# Patient Record
Sex: FEMALE | Race: WHITE | Employment: OTHER | URBAN - METROPOLITAN AREA
[De-identification: names, ages, dates, MRNs, and addresses within clinical notes are randomized per-mention and may not be internally consistent; named-entity substitution may affect disease eponyms.]

---

## 2018-07-08 ENCOUNTER — TRANSFERRED RECORDS (OUTPATIENT)
Dept: HEALTH INFORMATION MANAGEMENT | Facility: CLINIC | Age: 23
End: 2018-07-08

## 2018-07-08 LAB
ALT SERPL-CCNC: 20 IU/L (ref 12–65)
AST SERPL-CCNC: 17 IU/L (ref 15–37)
CREAT SERPL-MCNC: 0.96 MG/DL (ref 0.6–1.3)
GLUCOSE SERPL-MCNC: 92 MG/DL (ref 70–100)
POTASSIUM SERPL-SCNC: 3.1 MMOL/L (ref 3.5–5.1)

## 2018-07-09 ENCOUNTER — HOSPITAL ENCOUNTER (INPATIENT)
Facility: HOSPITAL | Age: 23
LOS: 3 days | Discharge: HOME | End: 2018-07-12
Attending: PSYCHIATRY & NEUROLOGY | Admitting: PSYCHIATRY & NEUROLOGY
Payer: COMMERCIAL

## 2018-07-09 PROBLEM — F22 PARANOIA (H): Status: ACTIVE | Noted: 2018-07-09

## 2018-07-09 PROCEDURE — 12400000 ZZH R&B MH

## 2018-07-09 RX ORDER — ALUMINA, MAGNESIA, AND SIMETHICONE 2400; 2400; 240 MG/30ML; MG/30ML; MG/30ML
30 SUSPENSION ORAL EVERY 4 HOURS PRN
Status: DISCONTINUED | OUTPATIENT
Start: 2018-07-09 | End: 2018-07-12 | Stop reason: HOSPADM

## 2018-07-09 RX ORDER — OLANZAPINE 10 MG/2ML
10 INJECTION, POWDER, FOR SOLUTION INTRAMUSCULAR
Status: DISCONTINUED | OUTPATIENT
Start: 2018-07-09 | End: 2018-07-12 | Stop reason: HOSPADM

## 2018-07-09 RX ORDER — HYDROXYZINE HYDROCHLORIDE 25 MG/1
25 TABLET, FILM COATED ORAL EVERY 4 HOURS PRN
Status: DISCONTINUED | OUTPATIENT
Start: 2018-07-09 | End: 2018-07-12 | Stop reason: HOSPADM

## 2018-07-09 RX ORDER — ACETAMINOPHEN 325 MG/1
650 TABLET ORAL EVERY 4 HOURS PRN
Status: DISCONTINUED | OUTPATIENT
Start: 2018-07-09 | End: 2018-07-12 | Stop reason: HOSPADM

## 2018-07-09 RX ORDER — OLANZAPINE 10 MG/1
10 TABLET ORAL
Status: DISCONTINUED | OUTPATIENT
Start: 2018-07-09 | End: 2018-07-12 | Stop reason: HOSPADM

## 2018-07-09 RX ORDER — TRAZODONE HYDROCHLORIDE 50 MG/1
50 TABLET, FILM COATED ORAL
Status: DISCONTINUED | OUTPATIENT
Start: 2018-07-09 | End: 2018-07-12 | Stop reason: HOSPADM

## 2018-07-09 ASSESSMENT — ACTIVITIES OF DAILY LIVING (ADL)
FALL_HISTORY_WITHIN_LAST_SIX_MONTHS: NO
BATHING: 0-->INDEPENDENT
SWALLOWING: 0-->SWALLOWS FOODS/LIQUIDS WITHOUT DIFFICULTY
GROOMING: INDEPENDENT
DRESS: SCRUBS (BEHAVIORAL HEALTH)
DRESS: 0-->INDEPENDENT
LAUNDRY: UNABLE TO COMPLETE
RETIRED_COMMUNICATION: 0-->UNDERSTANDS/COMMUNICATES WITHOUT DIFFICULTY
COGNITION: 0 - NO COGNITION ISSUES REPORTED
ORAL_HYGIENE: INDEPENDENT
RETIRED_EATING: 0-->INDEPENDENT
TOILETING: 0-->INDEPENDENT
AMBULATION: 0-->INDEPENDENT
TRANSFERRING: 0-->INDEPENDENT

## 2018-07-09 NOTE — IP AVS SNAPSHOT
[facility]    [address] [place] [id number]    Phone:  [phone]    Fax:  [phone]                                       After Visit Summary   7/9/2018    Keyla Pierce    MRN: [id number]           After Visit Summary Signature Page     I have received my discharge instructions, and my questions have been answered. I have discussed any challenges I see with this plan with the nurse or doctor.    ..........................................................................................................................................  Patient/Patient Representative Signature      ..........................................................................................................................................  Patient Representative Print Name and Relationship to Patient    ..................................................               ................................................  Date                                            Time    ..........................................................................................................................................  Reviewed by Signature/Title    ...................................................              ..............................................  Date                                                            Time

## 2018-07-09 NOTE — IP AVS SNAPSHOT
MRN:[id number]                      After Visit Summary   7/9/2018    Keyla Pierce    MRN: [id number]           Thank you!     Thank you for choosing [facility] for your care. Our goal is always to provide you with excellent care. Hearing back from our patients is one way we can continue to improve our services. Please take a few minutes to complete the written survey that you may receive in the mail after you visit with us. Thank you!        Patient Information     Date Of Birth          1995        Designated Caregiver       Most Recent Value    Caregiver    Will someone help with your care after discharge? no      About your hospital stay     You were admitted on:  July 9, 2018 You last received care in the:  [facility]    You were discharged on:  July 12, 2018       Who to Call     For medical emergencies, please call 911.  For non-urgent questions about your medical care, please call your primary care provider or clinic, None          Attending Provider     Provider Specialty    Benedicto Whelan MD Psychiatry    Mary Bellamy APRN CNP Nurse Practitioner       Primary Care Provider Fax #    Physician No Ref-Primary [phone]      Further instructions from your care team       Behavioral Discharge Planning and Instructions    Summary: Keyla came into the ED due to not sleeping and having paranoid thoughts about her and her family not being safe.    Main Diagnosis: Catatonia, R/O Bipolar I Disorder with psychosis, R/O Schizo - disorders    Major Treatments, Procedures and Findings: Stabilize with medications, connect with community programs.    Symptoms to Report: feeling more aggressive, increased confusion, losing more sleep, mood getting worse or thoughts of suicide    Lifestyle Adjustment: Take all medications as prescribed, meet with doctor/ medication provider, out patient therapist as scheduled. Abstain from alcohol or any unprescribed drugs.    Psychiatry  "Follow-up:     Mobile Crisis Team/ Crisis Hotline: Call 1-252.342.9796 to reach [facility] Crisis Response    Parents will schedule her with therapy and PCP.    Resources:   Crisis Intervention: 448.219.7439 or 610-640-1226 (TTY: 821.839.8849).  Call anytime for help.  National [place] on Mental Illness (www.[place].kalina.org): 852.723.2226 or 610-292-1096.  Alcoholics Anonymous (www.alcoholics-anonymous.org): Check your phone book for your local chapter.  Suicide Awareness Voices of Education (SAVE) (www.save.org): 842-855-AIXC (2062)  National Suicide Prevention Line (www.[employer].mn.org): 865-443-KNJV (4583)  Mental Health Consumer/Survivor Network of [place] (www.mhcsn.net): 603.387.4759 or 063-817-8593  Mental Health Association of [place] (www.mentalhealth.org): 249.872.9026 or 712-948-0691    General Medication Instructions:   See your medication sheet(s) for instructions.   Take all medicines as directed.  Make no changes unless your doctor suggests them.   Go to all your doctor visits.  Be sure to have all your required lab tests. This way, your medicines can be refilled on time.  Do not use any drugs not prescribed by your doctor.  Avoid alcohol.      Pending Results     No orders found from 7/7/2018 to 7/10/2018.            Statement of Approval     Ordered          07/12/18 1206  I have reviewed and agree with all the recommendations and orders detailed in this document.  EFFECTIVE NOW     Approved and electronically signed by:  Mary Bellamy APRN CNP             Admission Information     Date & Time Provider Department Dept. Phone    7/9/2018 Mary Bellamy APRN CNP [facility] [phone]      Your Vitals Were     Blood Pressure Pulse Temperature Respirations Height Weight    113/60 91 96  F (35.6  C) (Tympanic) 18 1.549 m (5' 1\") 53.1 kg (117 lb)    Pulse Oximetry BMI (Body Mass Index)                100% 22.11 kg/m2          [facility] Information     [facility] lets you send messages to " "your doctor, view your test results, renew your prescriptions, schedule appointments and more. To sign up, go to [email or web address] . Click on \"Log in\" on the left side of the screen, which will take you to the Welcome page. Then click on \"Sign up Now\" on the right side of the page.     You will be asked to enter the access code listed below, as well as some personal information. Please follow the directions to create your username and password.     Your access code is: [email or web address]  Expires: 10/10/2018  2:18 PM     Your access code will  in 90 days. If you need help or a new code, please call your [facility] clinic or [phone].        Care EveryWhere ID     This is your Care EveryWhere ID. This could be used by other organizations to access your [facility] medical records  [id number]        Equal Access to Services     [facility]: Hadii agnieszka Snyder, washandra devine, seng powersalmabon roman, prerna watt . So [facility] [phone].    ATENCIÓN: Si habla español, tiene a worrell disposición servicios gratuitos de asistencia lingüística. Beck[place]vng al [phone].    We comply with applicable federal civil rights laws and [place] laws. We do not discriminate on the basis of race, color, national origin, age, disability, sex, sexual orientation, or gender identity.               Review of your medicines      Notice     You have not been prescribed any medications.             Protect others around you: Learn how to safely use, store and throw away your medicines at www.disposemymeds.org.             Medication List: This is a list of all your medications and when to take them. Check marks below indicate your daily home schedule. Keep this list as a reference.      Notice     You have not been prescribed any medications.      "

## 2018-07-10 LAB
ALBUMIN SERPL-MCNC: 4.3 G/DL (ref 3.4–5)
ALP SERPL-CCNC: 56 U/L (ref 40–150)
ALT SERPL W P-5'-P-CCNC: 21 U/L (ref 0–50)
ANION GAP SERPL CALCULATED.3IONS-SCNC: 8 MMOL/L (ref 3–14)
AST SERPL W P-5'-P-CCNC: 14 U/L (ref 0–45)
BILIRUB SERPL-MCNC: 0.7 MG/DL (ref 0.2–1.3)
BUN SERPL-MCNC: 7 MG/DL (ref 7–30)
CALCIUM SERPL-MCNC: 9.6 MG/DL (ref 8.5–10.1)
CHLORIDE SERPL-SCNC: 107 MMOL/L (ref 94–109)
CO2 SERPL-SCNC: 26 MMOL/L (ref 20–32)
CREAT SERPL-MCNC: 0.94 MG/DL (ref 0.52–1.04)
GFR SERPL CREATININE-BSD FRML MDRD: 73 ML/MIN/1.7M2
GLUCOSE SERPL-MCNC: 80 MG/DL (ref 70–99)
POTASSIUM SERPL-SCNC: 4.4 MMOL/L (ref 3.4–5.3)
PROT SERPL-MCNC: 7.8 G/DL (ref 6.8–8.8)
SODIUM SERPL-SCNC: 141 MMOL/L (ref 133–144)

## 2018-07-10 PROCEDURE — 25000132 ZZH RX MED GY IP 250 OP 250 PS 637: Performed by: NURSE PRACTITIONER

## 2018-07-10 PROCEDURE — 80053 COMPREHEN METABOLIC PANEL: CPT | Performed by: NURSE PRACTITIONER

## 2018-07-10 PROCEDURE — 12400000 ZZH R&B MH

## 2018-07-10 PROCEDURE — 36415 COLL VENOUS BLD VENIPUNCTURE: CPT | Performed by: NURSE PRACTITIONER

## 2018-07-10 PROCEDURE — 99223 1ST HOSP IP/OBS HIGH 75: CPT | Performed by: NURSE PRACTITIONER

## 2018-07-10 RX ORDER — LORAZEPAM 1 MG/1
1 TABLET ORAL 3 TIMES DAILY
Status: DISCONTINUED | OUTPATIENT
Start: 2018-07-10 | End: 2018-07-11

## 2018-07-10 RX ORDER — LORAZEPAM 2 MG/ML
1 INJECTION INTRAMUSCULAR 3 TIMES DAILY
Status: DISCONTINUED | OUTPATIENT
Start: 2018-07-10 | End: 2018-07-11

## 2018-07-10 RX ADMIN — TRAZODONE HYDROCHLORIDE 50 MG: 50 TABLET ORAL at 03:23

## 2018-07-10 RX ADMIN — LORAZEPAM 1 MG: 1 TABLET ORAL at 14:33

## 2018-07-10 RX ADMIN — OLANZAPINE 10 MG: 10 TABLET ORAL at 09:37

## 2018-07-10 ASSESSMENT — ACTIVITIES OF DAILY LIVING (ADL)
DRESS: SCRUBS (BEHAVIORAL HEALTH)
LAUNDRY: UNABLE TO COMPLETE
GROOMING: INDEPENDENT
ORAL_HYGIENE: INDEPENDENT

## 2018-07-10 NOTE — PLAN OF CARE
"Face to face end of shift report received from NOIN Chin. Rounding completed. Patient observed. Awake in her room when staff alerted this writer - pts roommate was on her her knees at bedside - holding pts hand - relayed to me that \"shes scared\" advised roommate that she could come and get me or other nurse to comfort pt - explained to pt the 15 min checks and we would allow no one to come into her room or hurt her - she voiced concern for her parents and their safety - advised pt I would let her know if unit rec any calls from her parents.  Pt is slow to respond - and does sit and stare at this writer - appears to be responding - but when I ask she continues to stare. Did ask her If she wanted food, fluid or a medication - did agree to a med - going to administer med.      Edith Reeves  7/10/2018  2:37 AM          "

## 2018-07-10 NOTE — PLAN OF CARE
"Problem: Patient Care Overview  Goal: Individualization & Mutuality  Patient will be treatment team and medication compliant during hospitalization.  Patient will sleep 6-8 hours per night.  Patient will attend at least 50% of groups daily.  Patient will verbalize a decrease in paranoid thoughts by discharge.  Patient will have reality based conversation during hospitalization.   Patient sitting in day room with peers at breakfast. Delayed response during introduction with this writer and eyes looking dramatically upwards. After breakfast, patient compliant with room changes d/t concerns with starting roommates boundaries. Patient has poor concentration during assessment, appears stiff and eyes are increasingly looking upwards. Denies hallucinations slowly stating, \"I'm sorry, I'm trying to stop my eyes but it's not working\". Patient would close and rub eyes which would help for a short period but eyes gradually redirect back to ceiling.   0937- Received PRN Zyprexa 10 mg. Patient states, \"thank you, I think I need something\".   Within the hour, patient is participating in groups. Does not appear as stiff and eye contact appropriate. Denies SI/HI and pain at this time.   Patient laying in bed to rest around 1100. Woke up shortly around lunch, declining tray. Continues to lay in bed napping for remainder of shift, waking up off/on when this writer went to check on. Compliant with scheduled Ativan starting this shift. Continue to monitor at this time.  Missed call from patient's father this shift. No answer when returning call. Will report to next shift.       Problem: Thought Process Alteration (Adult)  Goal: Identify Related Risk Factors and Signs and Symptoms  Related risk factors and signs and symptoms are identified upon initiation of Human [facility] Clinical Practice Guideline (CPG).   Continue to monitor at this time.   Goal: Improved Thought Process  Patient will verbalize a decrease in paranoid thinking by " discharge.  Patient will be able to have reality based conversation during hospitalization.   Continue to monitor at this time.

## 2018-07-10 NOTE — PLAN OF CARE
Problem: Patient Care Overview  Goal: Team Discussion  Team Plan:   BEHAVIORAL TEAM DISCUSSION    Participants: Anya Glover NP, Mary Bellamy NP, Fawn Palacios NP, Beverly Winter [facility], Alma Delia Contreras [facility], Megha Majano RN, Tita Ernandez RN, Mini [facility], Alma Pimentel OT, Mary Gandhi OT  Progress: new admit, paranoid  Continued Stay Criteria/Rationale: new admit, NP to assess, paranoid  Medical/Physical: None known at this time  Precautions:   Behavioral Orders   Procedures     Code 1 - Restrict to Unit     Routine Programming     As clinically indicated     Status 15     Every 15 minutes.     Plan: NP to assess and determine  Rationale for change in precautions or plan: none
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
## 2018-07-10 NOTE — PROGRESS NOTES
"Attempted several times throughout the day, unable to interview patient for H&P assessment today as she was medicated with 10mg Zyprexa earlier and remains very sleepy and difficult to engage.  She is able to agree to try Ativan for possible catatonia, stating \"yes, that would be good\".     Trial Ativan 1mg tid, monitor, and will re-assess in the morning.    Review admit notes and subsequent documentation.  "

## 2018-07-10 NOTE — PLAN OF CARE
"Problem: Patient Care Overview  Goal: Individualization & Mutuality  Patient will be treatment team and medication compliant during hospitalization.  Patient will sleep 6-8 hours per night.  Patient will attend at least 50% of groups daily.  Patient will verbalize a decrease in paranoid thoughts by discharge.  Patient will have reality based conversation during hospitalization.   Outcome: No Change  ADMISSION NOTE    Reason for admission paranoia, delusional thoughts.  Safety concerns none.  Risk for or history of violence none.   Full skin assessment: unremarkable    Patient arrived on unit from [facility] accompanied by security and ambulance staff on 7/9/2018  6:33 PM.   Status on arrival: calm and cooperative  /92  Pulse 91  Temp 97.3  F (36.3  C) (Tympanic)  Resp 15  Ht 1.549 m (5' 1\")  Wt 53.1 kg (117 lb)  SpO2 97%  BMI 22.11 kg/m2  Patient given tour of unit and Welcome to  unit papers given to patient, wanding completed, belongings inventoried, and admission assessment completed.   Patient's legal status on arrival is 72 Hour Hold. Appropriate legal rights discussed with and copy given to patient. Patient Bill of Rights discussed with and copy given to patient.   Patient denies SI, HI, and thoughts of self harm and contracts for safety while on unit.      Elsy MATTHIAS Fermin  7/9/2018  9:36 PM    Patient arrived on unit from [facility] ED. Patient was brought in due to paranoia and delusional thoughts of being mutilated and thinking her family is dead. Patient checks family's pulses in the night to see if they are alive. Reports that patient had not been sleeping and took an Excedrin PM and 3 ounces of liquid melatonin, then woke her family due to heart palpitations and feeling like she was dying. Patient was at [facility] and returned in May, since then patient has had increased paranoia and shuts down and does not want to talk about it. UDS was negative.     Patient " "pleasant and cooperative. Affect is full range, mood is calm. Admits to \"a little\" anxiety. States she is here because she was \"experiencing a lot of fear\", that she took Excedrin PM and a natural sleep aid, and that she wants \"therapy for my fears\". States she thinks people are listening to her conversations through her phone. Patient reports no current medications, or pharmacy. Allergies include dairy and gluten.    Dad called to talk to writer, states patient has had an increase in paranoia and fear in the last few months, that patient wants to get to the bottom of her fears, that college triggered this paranoia and fear that everybody is looking at her and wanting to kill her, that her whole life is out in the open.      Patient was given a late dinner, then went to her bed, states she is tired.            "

## 2018-07-10 NOTE — PLAN OF CARE
"Problem: Patient Care Overview  Goal: Individualization & Mutuality  Patient will be treatment team and medication compliant during hospitalization.  Patient will sleep 6-8 hours per night.  Patient will attend at least 50% of groups daily.  Patient will verbalize a decrease in paranoid thoughts by discharge.  Patient will have reality based conversation during hospitalization.   Outcome: No Change  Slept for 7 hours at best -  trazadone did help a bit with sleep - pt did talk more when this writer took pulse manually - was up in her room making her bed - did say she has been attending school at [employer] - taking psych classes.  Does say \"i suppose I can't do that job now\" advised her yes she could and that she would probably be better at her job - knowing the 'other side' of psych.  Polite - pleasant - appropriate this am.      "

## 2018-07-10 NOTE — PROGRESS NOTES
07/09/18 1928   Patient Belongings   Did you bring any home meds/supplements to the hospital?  No   Patient Belongings clothing;shoes   Disposition of Belongings Other (see comment)  (sent to [facility])   Belongings Search Yes   Clothing Search Yes   Second Staff kae   General Info Comment pink shirt pink flanel pj bottoms grey bra brown fleece zipup purple underwear black buckle shoes white bra blue lace bra pair black socks 3 pair black socks with color band 6 pair linda hipster under wear sudoku book small newtestament book elements of elyssa book seven things book hemp soap degree deoderant small toothpaste green tothbrush card small soft cover bible lip balm    List items sent to safe: N/A  All other belongings put in assigned cubby in belongings room.     I have reviewed my belongings list on admission and verify that it is correct.     Patient signature_______________________________    Second staff witness (if patient unable to sign) ______________________________       I have received all my belongings at discharge.    Patient signature________________________________    Damion   7/9/2018  7:53 PM

## 2018-07-10 NOTE — PLAN OF CARE
Face to face end of shift report received from Pao GODFREY RN. Rounding completed. Patient observed in room.     Elsy Fermin  7/10/2018  4:07 PM

## 2018-07-10 NOTE — PLAN OF CARE
Face to face end of shift report received from Edith MOURA RN. Rounding completed. Patient observed sitting in day room with peers. No requests at this time.     Pao Mart  7/10/2018  7:55 AM

## 2018-07-11 PROCEDURE — 12400000 ZZH R&B MH

## 2018-07-11 PROCEDURE — 25000132 ZZH RX MED GY IP 250 OP 250 PS 637: Performed by: NURSE PRACTITIONER

## 2018-07-11 PROCEDURE — 99238 HOSP IP/OBS DSCHRG MGMT 30/<: CPT | Performed by: NURSE PRACTITIONER

## 2018-07-11 RX ORDER — LORAZEPAM 0.5 MG/1
0.5 TABLET ORAL EVERY 4 HOURS PRN
Status: DISCONTINUED | OUTPATIENT
Start: 2018-07-11 | End: 2018-07-12 | Stop reason: HOSPADM

## 2018-07-11 RX ADMIN — LORAZEPAM 1 MG: 1 TABLET ORAL at 06:48

## 2018-07-11 ASSESSMENT — ACTIVITIES OF DAILY LIVING (ADL)
LAUNDRY: UNABLE TO COMPLETE
ORAL_HYGIENE: INDEPENDENT
DRESS: SCRUBS (BEHAVIORAL HEALTH);INDEPENDENT
LAUNDRY: UNABLE TO COMPLETE
DRESS: SCRUBS (BEHAVIORAL HEALTH)
ORAL_HYGIENE: INDEPENDENT
GROOMING: INDEPENDENT
GROOMING: INDEPENDENT

## 2018-07-11 NOTE — PLAN OF CARE
"Problem: Thought Process Alteration (Adult)  Goal: Improved Thought Process  Patient will verbalize a decrease in paranoid thinking by discharge.  Patient will be able to have reality based conversation during hospitalization.   Outcome: Improving  Took scheduled ativan reluctantly - was speaking to this writer and roommate clearly and appropriately - when explained what med was for pt states \"im talking\" and did just that - talked a bit - laughed appropriately - was concerned med would make her tired.  Advised her I would notify staff/CNP of her improvements and her reasoning.      "

## 2018-07-11 NOTE — PLAN OF CARE
"Problem: Patient Care Overview  Goal: Individualization & Mutuality  Patient will be treatment team and medication compliant during hospitalization.  Patient will sleep 6-8 hours per night.  Patient will attend at least 50% of groups daily.  Patient will verbalize a decrease in paranoid thoughts by discharge.  Patient will have reality based conversation during hospitalization.   Patient is calm and cooperative with this writer during nursing assessment. She presents with full range affect, mood is calm . Pt states, \"I feel much better than yesterday. My eyes rolled to the back of my head. That was scary.\" Pt states this has never happened before. She states her Potassium may have been off contributing to eye movement along with possible medications. Pt states she has not have any symptoms today. No side effects observed by this writer. Pt denies illness. Pt is scheduled no medications currently. She denies having suicidal ideation, homicidal ideation, anxiety, or depression. Pt denies having pain. Pt is attending groups and participates all day. Pt does not have any questions regarding plan of care. Pt does not make any paranoid statements. Pt has reality based conversation.  Will continue to monitor and document any changes.     Problem: Thought Process Alteration (Adult)  Goal: Improved Thought Process  Patient will verbalize a decrease in paranoid thinking by discharge.  Patient will be able to have reality based conversation during hospitalization.   Outcome: No Change  Pt had made no paranoid statements. Pt able to have reality  Based conversation.       "

## 2018-07-11 NOTE — PLAN OF CARE
Face to face end of shift report received from Ana AG RN. Rounding completed. Patient observed in group room.     Elsy Fermin  7/11/2018  3:48 PM

## 2018-07-11 NOTE — PLAN OF CARE
Face to face end of shift report received from Edith Reeves RN. Rounding completed. Patient observed.     Celsa Chapa  7/11/2018  8:01 AM

## 2018-07-11 NOTE — PLAN OF CARE
Problem: Patient Care Overview  Goal: Individualization & Mutuality  Patient will be treatment team and medication compliant during hospitalization.  Patient will sleep 6-8 hours per night.  Patient will attend at least 50% of groups daily.  Patient will verbalize a decrease in paranoid thoughts by discharge.  Patient will have reality based conversation during hospitalization.   Outcome: Improving  Slept all noc without issue

## 2018-07-11 NOTE — PLAN OF CARE
Face to face end of shift report received from NONI Chin. Rounding completed. Patient observed. Lying in supine position - eyes closed - non-labored breathing noted.     Edith Reeves  7/11/2018  1:45 AM

## 2018-07-11 NOTE — PLAN OF CARE
Problem: Patient Care Overview  Goal: Team Discussion  Team Plan:   BEHAVIORAL TEAM DISCUSSION    Participants:  Mary Bellamy NP,  Valentina Barnes NP,Zachary Henderson NP, Beverly Winter LICSW, Anjana Santos LSW,  Alma Delia Contreras SW, Naty Cordero RN, Pricila Hsieh RN,  [facility], Silvia Juarez OT, Mary Gandhi OT  Progress: Minimal: paranoid. Thought blocking.   Continued Stay Criteria/Rationale: Trial Ativan 1mg tid, monitor, and will re-assess.   Medical/Physical: eye locking in upward position.   Precautions:   Behavioral Orders   Procedures     Code 1 - Restrict to Unit     Routine Programming     As clinically indicated     Status 15     Every 15 minutes.     Plan: Stabilize on medications.   Rationale for change in precautions or plan: none
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
## 2018-07-11 NOTE — H&P
"Psychiatric Eval/H&P  Patient Name: Keyla Pierce   YOB: 1995  Age: 23 year old  [id number]    Primary Physician: No Ref-Primary, Physician   Completed By: JAIME Barreto CNP     CC:  Paranoia    HPI  (per admit) Pt brought in by EMS because she wasn't feeling well. Pt has had trouble sleeping so she took an Excedrin PM and a natural sleep aid which caused her to feel funny. EMS noted pt was acting oddly. In ED pt is paranoid, thinks someone is after her and her family, believes they are all going to die. Pt also reported she took the sleep meds to help numb the pain because she felt she was going to be attacked that night. Pt has no previous dx, OP providers, or hx of inpatient admissions. Parents report pt was herself until she came back from college. They report she is paranoid, unable to focus, only sleeping 2-3 hours a night, will wake up her family to check if they are alive, etc. Pt denies all SI/HI. No medical or legal hx. No hx of aggression. Pt denies substance abuse and UTOX negative. EKG WNL, Poison control contacted. Potassium slightly low at 3.1  Pt is medically cleared.     Patient presents much improved from yesterdays admit.  Yesterday, she struggled to carry on a conversation \"locking up\" as appearing very stiff and her eyes would lock in the up position and patient could not control this.  She was administered a dose of Zyprexa and prescribed Ativan 1mg tid as suspected catatonoia.  Currently, patient reports feeling much better, no stiffness and her eyes are not locking.  She is able to carry on a good conversation, denies feelings of paranoia currently.  Patient was also able to sleep most of the day yesterday and last night, as prior to admission she was not sleeping.  She no longer feels her parents are in danger, she does not feel like she is in any danger here on the unit.  We spend time discussing her symptoms prior to coming in as possible onset of " "schizophrenia type illness or bipolar type, patient is adamant it is not anything like that and will likely not happen again. She is aware of the symptoms associated and agrees to monitor going forward.  Patient denies suicidal or homicidal ideation and has no history of self injurious behaviors.  She denies symptoms for depression or anxiety.  Patient does complain currently of some unsteadiness while walking, feeling a bit \"woozy\", she would like to not take any Ativan and see if this goes away.  We discuss the likelihood of taking the medication is the reason she has improved, we could decrease the dose and continue.  Patient would like to discontinue though and monitor for worsening symptoms.    Past Psychiatric History:   Patient has no psychiatric history.     Social History:   Patient single, no children, and resides with her parents and brother in [place].  She has attended college at [employer] and plans to take a break from college for now and focus more on self-care.  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  She has a brother and sister, her sister is a musician out in [place] and brother also lives at home.  Patient is very close to her family.  She spends time reading the bible, she states she would enjoy going fishing or watching a movie with her family.     Chemical Use History:   Patient denies use of alcohol or illicit drugs, no history of use.     Family Psychiatric History:   None known     Medical History and ROS  Prior to Admission medications    Not on File     Allergies   Allergen Reactions     Gluten Meal      Milk Protein Extract      No past medical history on file.  No past surgical history on file.    Physical Exam  Constitutional: oriented to person, place, and time, appears well-developed and well-nourished.   HENT: WNL, no concerns  Neck: Normal range of motion   Cardiovascular: Normal rate, regular rhythm  Pulmonary/Chest: " "Effort normal and breath sounds normal  Abdominal: WNL, no concerns  Skin: Dry, intact, no open areas, rashes, moles of concern    Review of Systems:  Constitution: No weight loss, fever, night sweats  Skin: No rashes, pruritus or open wounds  Neuro: No headaches or seizure activity.  Psych:  See HPI  Eyes: No vision changes.  ENT: No problems chewing or swallowing.   Musculoskeletal: No muscle pain, joint pain or swelling   Respiratory: No cough or dyspnea  Cardiovascular:  No chest pain,  palpitations or fainting  Gastrointestinal:  No abdominal pain, nausea, vomiting or change in bowel habits    MSE/PSYCH  PSYCHIATRIC EXAM  /72  Pulse 71  Temp 98.5  F (36.9  C) (Tympanic)  Resp 14  Ht 1.549 m (5' 1\")  Wt 53.1 kg (117 lb)  SpO2 99%  BMI 22.11 kg/m2     -Appearance/Behavior: No apparent distress and Casually groomed  {attitude:pleasant and cooperative  -Motor: normal or unremarkable.  -Gait: Normal - patient feels unsteady on her feet  -Abnormal involuntary movements: None noted  -Mood: description consistent with euthymia.  -Affect: Appropriate/mood-congruent.  -Speech: Normal                  -Thought process/associations: Logical, Linear and Loose associations.  -Thought content: possible mood-congruent delusions loose associations.  -Perceptual disturbances: No hallucinations. and although perceptional paranoia exists.              -Suicidal/Homicidal Ideation: Denies  -Judgment: Good.  -Insight: Good.  *Orientation: time, place and person.  *Memory: Intact  *Attention: Good  *Language: fluent, no aphasias, able to repeat phrases and name objects. Vocab intact.  *Fund of information: appropriate for education  *Cognitive functioning estimate: 0 - independent.     Labs:  Results for orders placed or performed during the hospital encounter of 07/09/18 (from the past 24 hour(s))   Comprehensive metabolic panel   Result Value Ref Range    Sodium 141 133 - 144 mmol/L    Potassium 4.4 3.4 - 5.3 mmol/L    " "Chloride 107 94 - 109 mmol/L    Carbon Dioxide 26 20 - 32 mmol/L    Anion Gap 8 3 - 14 mmol/L    Glucose 80 70 - 99 mg/dL    Urea Nitrogen 7 7 - 30 mg/dL    Creatinine 0.94 0.52 - 1.04 mg/dL    GFR Estimate 73 >60 mL/min/1.7m2    GFR Estimate If Black 88 >60 mL/min/1.7m2    Calcium 9.6 8.5 - 10.1 mg/dL    Bilirubin Total 0.7 0.2 - 1.3 mg/dL    Albumin 4.3 3.4 - 5.0 g/dL    Protein Total 7.8 6.8 - 8.8 g/dL    Alkaline Phosphatase 56 40 - 150 U/L    ALT 21 0 - 50 U/L    AST 14 0 - 45 U/L     Assessment/Impression:   Patient with no prior mental health history is admitted for experiencing paranoia and delusions, not sleeping, these being sudden onset.  Arriving on the unit, patient was stiff and in a possible catatonic state with her eyes locked in the up-right position, unable to to have complete discussion.  Given one does zyprexa and scheduled Ativan 1mg, patient slept most of the day and night, appears brighter today and not catatonic.  Patient would like to stop the Ativan however as she is feeling unsteady, agrees to monitor returning symptoms and alert staff.  Will consider taking again at lower dose.  She is also concerned about her potassium level being too low, assure her it is WNL today at 4.4.    Educated regarding medication indications, risks, benefits, side effects, contraindications and possible interactions. Verbally expressed understanding.     DX:  Catatonia  R/O Bipolar I Disorder with psychosis  R/O Schizo - disorders     Plan:  Admit to Unit: [address]  Monitor for target symptoms:   Provide a safe environment and therapeutic milieu.     Started Ativan 1mg tid for possible catatonia  ** Stopped Ativan today due to patient request as she was unsteady on her feet and felt \"woosy\"  Monitor for symptoms returning     Anticipated length of stay: 3-5 days for stabilization       Mary Bellamy, APRN, CNP  "

## 2018-07-11 NOTE — PLAN OF CARE
"Problem: Patient Care Overview  Goal: Individualization & Mutuality  Patient will be treatment team and medication compliant during hospitalization.  Patient will sleep 6-8 hours per night.  Patient will attend at least 50% of groups daily.  Patient will verbalize a decrease in paranoid thoughts by discharge.  Patient will have reality based conversation during hospitalization.   Outcome: Improving  Patient out on open unit, socializing with peers and attending groups. Denies all criteria. Affect is bright, mood is elated. Is giddy during conversation, with pressured speech. States she remembers yesterday, and is happy she is feeling better today. States she was so tired and was finding it difficult to move her limbs and keep her eyes open, but she remembers the conversations she had with her family while they visited. States \"praise the Lord, I am so happy I got some rest, I really needed it\". States she is very excited to see her parents tonight. Has been appropriate with staff and peers.   1645-pulse rate high at 115 by monitor, 120 manually. Patient has no complaints of feeling \"woozy\", is steady on her feet. Provider updated. Patient encouraged to stay hydrated, patient in agreement, is sitting in lounge drinking ice water and playing cards with peers.   Parents visited this evening, are very polite and kind, hover very close during visit.  2000-patient in group room, dancing and laughing with peers.     Problem: Thought Process Alteration (Adult)  Goal: Improved Thought Process  Patient will verbalize a decrease in paranoid thinking by discharge.  Patient will be able to have reality based conversation during hospitalization.   Outcome: Improving  Patient able to have reality based conversation, has not made any delusional or paranoid statements.      "

## 2018-07-12 VITALS
DIASTOLIC BLOOD PRESSURE: 60 MMHG | OXYGEN SATURATION: 100 % | HEART RATE: 91 BPM | RESPIRATION RATE: 18 BRPM | SYSTOLIC BLOOD PRESSURE: 113 MMHG | BODY MASS INDEX: 22.09 KG/M2 | TEMPERATURE: 96 F | HEIGHT: 61 IN | WEIGHT: 117 LBS

## 2018-07-12 ASSESSMENT — ACTIVITIES OF DAILY LIVING (ADL)
ORAL_HYGIENE: INDEPENDENT
GROOMING: INDEPENDENT
LAUNDRY: UNABLE TO COMPLETE
DRESS: SCRUBS (BEHAVIORAL HEALTH);INDEPENDENT

## 2018-07-12 NOTE — PLAN OF CARE
Problem: Patient Care Overview  Goal: Individualization & Mutuality  Patient will be treatment team and medication compliant during hospitalization.  Patient will sleep 6-8 hours per night.  Patient will attend at least 50% of groups daily.  Patient will verbalize a decrease in paranoid thoughts by discharge.  Patient will have reality based conversation during hospitalization.   Outcome: Improving  Patient is calm and cooperative with this writer during nursing assessment. Pt denies having suicidal ideation, homicidal ideation, anxiety, or depression. Patient states she feels she is doing great. Pt is social out on the unit and maintains appropriate boundaries with staff and peers. Pt is attending groups and participates. She reports sleeping well. According to documentation, pt slept 7 hours. No medications administererd thus far this shift. Pt has not made any paranoid statements. Pt is able to have realtiy based conversation. She is able to make needs known. Will continue to monitor and document any changes.     Problem: Thought Process Alteration (Adult)  Goal: Improved Thought Process  Patient will verbalize a decrease in paranoid thinking by discharge.  Patient will be able to have reality based conversation during hospitalization.   Outcome: Improving  Pt does not make any paranoid statements. Pt able to have reality based conversation.

## 2018-07-12 NOTE — PLAN OF CARE
Discharge Note    Patient discharged to home on 7/12/2018 3:10 PM via Private Car accompanied by [facility] staff to door wit parents.     Patient informed of discharge instructions in AVS. Patient verbalizes understanding and denies having any questions pertaining to AVS. Patient stable at time of discharge. Patient denies SI, HI, and thoughts of self harm at time of discharge. All personal belongings returned to patient.     Pao Mart  7/12/2018  3:10 PM

## 2018-07-12 NOTE — DISCHARGE INSTRUCTIONS
Behavioral Discharge Planning and Instructions    Summary: Keyla came into the ED due to not sleeping and having paranoid thoughts about her and her family not being safe.    Main Diagnosis: Catatonia, R/O Bipolar I Disorder with psychosis, R/O Schizo - disorders    Major Treatments, Procedures and Findings: Stabilize with medications, connect with community programs.    Symptoms to Report: feeling more aggressive, increased confusion, losing more sleep, mood getting worse or thoughts of suicide    Lifestyle Adjustment: Take all medications as prescribed, meet with doctor/ medication provider, out patient therapist as scheduled. Abstain from alcohol or any unprescribed drugs.    Psychiatry Follow-up:     Mobile Crisis Team/ Crisis Hotline: Call 1-527.137.5887 to reach [facility] Crisis Response    Parents will schedule her with therapy and PCP.    Resources:   Crisis Intervention: 696.986.5522 or 987-234-4168 (TTY: 879.639.1495).  Call anytime for help.  National [place] on Mental Illness (www.[place].kalina.org): 596.156.8868 or 079-923-2903.  Alcoholics Anonymous (www.alcoholics-anonymous.org): Check your phone book for your local chapter.  Suicide Awareness Voices of Education (SAVE) (www.save.org): 415-136-JPYJ (8583)  National Suicide Prevention Line (www.mentalhealthmn.org): 114-257-NJVW (9577)  Mental Health Consumer/Survivor Network of [place] (www.mhcsn.net): 382.492.9223 or 719-948-0391  Mental Health Association of [place] (www.mentalhealth.org): 183.496.6558 or 116-513-7792    General Medication Instructions:   See your medication sheet(s) for instructions.   Take all medicines as directed.  Make no changes unless your doctor suggests them.   Go to all your doctor visits.  Be sure to have all your required lab tests. This way, your medicines can be refilled on time.  Do not use any drugs not prescribed by your doctor.  Avoid alcohol.
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
## 2018-07-12 NOTE — PLAN OF CARE
Problem: Patient Care Overview  Goal: Discharge Needs Assessment  Outcome: Adequate for Discharge Date Met: 07/12/18  Lengthy 1:1 with patient to review coping skills and safe guards for when she goes home.  She responded well to this - more accepting about how she needs to be very intentional about her wellness and keeping herself alert.  She engaged well.  Called and spoke with mom- explained the same thing and she is very open to helping her with coping skills.  She will call and set up PCP as they don't really have one and she will schedule this to monitor her medical issues.  Also they will schedule her for therapy as they want to ask a few professional friends who she should go to.  They will come and get her this afternoon between 3-4.  Reviewed with the patient also - she is very excited.  Patient denies having any thoughts of paranoia and no thoughts of self harm.  Patient will be transported by her family.  Also went over the crisis line number and encouraged them to call if any concerns.

## 2018-07-12 NOTE — PLAN OF CARE
Face to face end of shift report received from Edith Reeves RN. Rounding completed. Patient observed.     Celsa Chapa  7/12/2018  8:05 AM

## 2018-07-12 NOTE — PLAN OF CARE
Problem: Patient Care Overview  Goal: Team Discussion  Team Plan:   BEHAVIORAL TEAM DISCUSSION    Participants: Mary Bellamy NP, Valentina Barnes NP, Zachary Henderson NP, Beverly [facility], Naty Cordero RN, Megha Majano RN,  Tita Ernandez RN, [facility], [facility] OT,Alma Pimentel OT, Mary Gandhi OT  Progress: moderate- brighter  Continued Stay Criteria/Rationale: stabilize, monitor for increased symptoms as patient is declining medications at this time  Medical/Physical: eyes are no longer locking upward  Precautions:   Behavioral Orders   Procedures     Code 1 - Restrict to Unit     Routine Programming     As clinically indicated     Status 15     Every 15 minutes.     Plan: stabilize on medications, referrals for CBT, PHP  Rationale for change in precautions or plan: none

## 2018-07-12 NOTE — PLAN OF CARE
Face to face end of shift report received from NONI Chin. Rounding completed. Patient observed. Lying on left side - eyes closed - non-labored breathing noted.     Edith Reeves  7/12/2018  12:47 AM

## 2018-07-12 NOTE — DISCHARGE SUMMARY
"[facility]    Discharge Summary  Adult Psychiatry    Date of Admission:  7/9/2018  Date of Discharge:  7/12/2018  Discharging Provider: Mary Bellamy    Discharge Diagnoses   Principal Discharge Diagnosis: Catatonia, with psychotic features  Secondary Discharge Diagnosis: None  Medical Diagnoses addressed this admission: Low Potassium    History of Present Illness   (per admit) Pt brought in by EMS because she wasn't feeling well. Pt has had trouble sleeping so she took an Excedrin PM and a natural sleep aid which caused her to feel funny. EMS noted pt was acting oddly. In ED pt is paranoid, thinks someone is after her and her family, believes they are all going to die. Pt also reported she took the sleep meds to help numb the pain because she felt she was going to be attacked that night. Pt has no previous dx, OP providers, or hx of inpatient admissions. Parents report pt was herself until she came back from college. They report she is paranoid, unable to focus, only sleeping 2-3 hours a night, will wake up her family to check if they are alive, etc. Pt denies all SI/HI. No medical or legal hx. No hx of aggression. Pt denies substance abuse and UTOX negative. EKG WNL, Poison control contacted. Potassium slightly low at 3.1  Pt is medically cleared.      Hospital Course   Patient presents much improved in a day, after she struggled to carry on a conversation \"locking up\" as appearing very stiff and her eyes would lock in the up position, she was administered a dose of Zyprexa and prescribed Ativan 1mg tid as suspected catatonoia.  Currently, patient reports feeling much better, no stiffness and her eyes are not locking.  She is able to carry on a good conversation, denies feelings of paranoia currently.  Patient has been sleeping good at night, up and social on the unit and attending group programming. Patient no longer feels her parents are in danger, she does not feel like she is in any danger " here on the unit or after she goes back home.  Patient denies suicidal or homicidal ideation and has no history of self injurious behaviors.  She denies symptoms for depression or anxiety.  Patient is encouraged to monitor for worsening symptoms and to seek help immediately.  She is also wanting to read books about CBT and Mindfulness, patient willing to see a therapist as well.  At time of discharge, patient safe from self harm and will discharge back home with parents.     Past Psychiatric History:   Patient has no psychiatric history      Mary Bellamy    Significant Results and Procedures   Results for orders placed or performed during the hospital encounter of 07/09/18   Comprehensive metabolic panel   Result Value Ref [facility]    Sodium 141 133 - 144 mmol/L    Potassium 4.4 3.4 - 5.3 mmol/L    Chloride 107 94 - 109 mmol/L    Carbon Dioxide 26 20 - 32 mmol/L    Anion Gap 8 3 - 14 mmol/L    Glucose 80 70 - 99 mg/dL    Urea Nitrogen 7 7 - 30 mg/dL    Creatinine 0.94 0.52 - 1.04 mg/dL    GFR Estimate 73 >60 mL/min/1.7m2    GFR Estimate If Black 88 >60 mL/min/1.7m2    Calcium 9.6 8.5 - 10.1 mg/dL    Bilirubin Total 0.7 0.2 - 1.3 mg/dL    Albumin 4.3 3.4 - 5.0 g/dL    Protein Total 7.8 6.8 - 8.8 g/dL    Alkaline Phosphatase 56 40 - 150 U/L    ALT 21 0 - 50 U/L    AST 14 0 - 45 U/L       Pending Results   None    Unresulted Labs Ordered in the Past 30 Days of this Admission     No orders found from 5/10/2018 to 7/10/2018.        Code Status   Full Code    Primary Care Physician   Physician No Ref-Primary    Physical Exam   Appearance:  awake, alert  Attitude:  cooperative  Eye Contact:  good  Mood:  better  Affect:  appropriate and in normal range  Speech:  clear, coherent  Psychomotor Behavior:  no evidence of tardive dyskinesia, dystonia, or tics  Thought Process:  logical and linear  Associations:  no loose associations  Thought Content:  no evidence of suicidal ideation or homicidal ideation and no evidence  of psychotic thought  Insight:  good  Judgment:  intact  Oriented to:  time, person, and place  Attention Span and Concentration:  intact  Recent and Remote Memory:  intact  Language: Able to name objects, Able to repeat phrases and Able to read and write  Fund of Knowledge: appropriate  Muscle Strength and Tone: normal  Gait and Station: Normal    Time Spent on this Encounter   Mary WARNER, personally saw the patient today and spent less than or equal to 30 minutes discharging this patient.    Discharge Disposition   Discharged to home  Condition at discharge: Stable    Consultations This Hospital Stay   None    Discharge Orders   No discharge procedures on file.  Discharge Medications   There are no discharge medications for this patient.    Allergies   Allergies   Allergen Reactions     Gluten Meal      Milk Protein Extract